# Patient Record
Sex: MALE | Race: WHITE | NOT HISPANIC OR LATINO | URBAN - METROPOLITAN AREA
[De-identification: names, ages, dates, MRNs, and addresses within clinical notes are randomized per-mention and may not be internally consistent; named-entity substitution may affect disease eponyms.]

---

## 2023-01-09 ENCOUNTER — EMERGENCY (EMERGENCY)
Facility: HOSPITAL | Age: 28
LOS: 1 days | Discharge: ANOTHER HOSPITAL | End: 2023-01-09
Admitting: EMERGENCY MEDICINE
Payer: MEDICARE

## 2023-01-09 ENCOUNTER — OUTPATIENT (OUTPATIENT)
Dept: OUTPATIENT SERVICES | Facility: HOSPITAL | Age: 28
LOS: 1 days | Discharge: STILL ADMITTED OR ADMINISTRATIVE | End: 2023-01-09
Payer: MEDICARE

## 2023-01-09 VITALS
RESPIRATION RATE: 18 BRPM | OXYGEN SATURATION: 100 % | SYSTOLIC BLOOD PRESSURE: 140 MMHG | TEMPERATURE: 98 F | HEART RATE: 96 BPM | DIASTOLIC BLOOD PRESSURE: 93 MMHG

## 2023-01-09 DIAGNOSIS — F33.2 MAJOR DEPRESSIVE DISORDER, RECURRENT SEVERE WITHOUT PSYCHOTIC FEATURES: ICD-10-CM

## 2023-01-09 DIAGNOSIS — F70 MILD INTELLECTUAL DISABILITIES: ICD-10-CM

## 2023-01-09 LAB
ALBUMIN SERPL ELPH-MCNC: 5.1 G/DL — HIGH (ref 3.3–5)
ALP SERPL-CCNC: 71 U/L — SIGNIFICANT CHANGE UP (ref 40–120)
ALT FLD-CCNC: 17 U/L — SIGNIFICANT CHANGE UP (ref 4–41)
AMPHET UR-MCNC: NEGATIVE — SIGNIFICANT CHANGE UP
ANION GAP SERPL CALC-SCNC: 15 MMOL/L — HIGH (ref 7–14)
APAP SERPL-MCNC: <10 UG/ML — LOW (ref 15–25)
APPEARANCE UR: CLEAR — SIGNIFICANT CHANGE UP
AST SERPL-CCNC: 23 U/L — SIGNIFICANT CHANGE UP (ref 4–40)
BARBITURATES UR SCN-MCNC: NEGATIVE — SIGNIFICANT CHANGE UP
BASOPHILS # BLD AUTO: 0.05 K/UL — SIGNIFICANT CHANGE UP (ref 0–0.2)
BASOPHILS NFR BLD AUTO: 0.6 % — SIGNIFICANT CHANGE UP (ref 0–2)
BENZODIAZ UR-MCNC: NEGATIVE — SIGNIFICANT CHANGE UP
BILIRUB SERPL-MCNC: 0.5 MG/DL — SIGNIFICANT CHANGE UP (ref 0.2–1.2)
BILIRUB UR-MCNC: NEGATIVE — SIGNIFICANT CHANGE UP
BUN SERPL-MCNC: 12 MG/DL — SIGNIFICANT CHANGE UP (ref 7–23)
CALCIUM SERPL-MCNC: 9.5 MG/DL — SIGNIFICANT CHANGE UP (ref 8.4–10.5)
CHLORIDE SERPL-SCNC: 101 MMOL/L — SIGNIFICANT CHANGE UP (ref 98–107)
CO2 SERPL-SCNC: 21 MMOL/L — LOW (ref 22–31)
COCAINE METAB.OTHER UR-MCNC: NEGATIVE — SIGNIFICANT CHANGE UP
COLOR SPEC: SIGNIFICANT CHANGE UP
CREAT SERPL-MCNC: 0.89 MG/DL — SIGNIFICANT CHANGE UP (ref 0.5–1.3)
CREATININE URINE RESULT, DAU: 61 MG/DL — SIGNIFICANT CHANGE UP
DIFF PNL FLD: NEGATIVE — SIGNIFICANT CHANGE UP
EGFR: 119 ML/MIN/1.73M2 — SIGNIFICANT CHANGE UP
EOSINOPHIL # BLD AUTO: 0.2 K/UL — SIGNIFICANT CHANGE UP (ref 0–0.5)
EOSINOPHIL NFR BLD AUTO: 2.4 % — SIGNIFICANT CHANGE UP (ref 0–6)
ETHANOL SERPL-MCNC: <10 MG/DL — SIGNIFICANT CHANGE UP
FLUAV AG NPH QL: SIGNIFICANT CHANGE UP
FLUBV AG NPH QL: SIGNIFICANT CHANGE UP
GLUCOSE SERPL-MCNC: 100 MG/DL — HIGH (ref 70–99)
GLUCOSE UR QL: NEGATIVE — SIGNIFICANT CHANGE UP
HCT VFR BLD CALC: 49.7 % — SIGNIFICANT CHANGE UP (ref 39–50)
HGB BLD-MCNC: 16.6 G/DL — SIGNIFICANT CHANGE UP (ref 13–17)
IANC: 4.61 K/UL — SIGNIFICANT CHANGE UP (ref 1.8–7.4)
IMM GRANULOCYTES NFR BLD AUTO: 0.1 % — SIGNIFICANT CHANGE UP (ref 0–0.9)
KETONES UR-MCNC: NEGATIVE — SIGNIFICANT CHANGE UP
LEUKOCYTE ESTERASE UR-ACNC: NEGATIVE — SIGNIFICANT CHANGE UP
LYMPHOCYTES # BLD AUTO: 2.81 K/UL — SIGNIFICANT CHANGE UP (ref 1–3.3)
LYMPHOCYTES # BLD AUTO: 34.1 % — SIGNIFICANT CHANGE UP (ref 13–44)
MCHC RBC-ENTMCNC: 28.7 PG — SIGNIFICANT CHANGE UP (ref 27–34)
MCHC RBC-ENTMCNC: 33.4 GM/DL — SIGNIFICANT CHANGE UP (ref 32–36)
MCV RBC AUTO: 85.8 FL — SIGNIFICANT CHANGE UP (ref 80–100)
METHADONE UR-MCNC: NEGATIVE — SIGNIFICANT CHANGE UP
MONOCYTES # BLD AUTO: 0.57 K/UL — SIGNIFICANT CHANGE UP (ref 0–0.9)
MONOCYTES NFR BLD AUTO: 6.9 % — SIGNIFICANT CHANGE UP (ref 2–14)
NEUTROPHILS # BLD AUTO: 4.61 K/UL — SIGNIFICANT CHANGE UP (ref 1.8–7.4)
NEUTROPHILS NFR BLD AUTO: 55.9 % — SIGNIFICANT CHANGE UP (ref 43–77)
NITRITE UR-MCNC: NEGATIVE — SIGNIFICANT CHANGE UP
NRBC # BLD: 0 /100 WBCS — SIGNIFICANT CHANGE UP (ref 0–0)
NRBC # FLD: 0 K/UL — SIGNIFICANT CHANGE UP (ref 0–0)
OPIATES UR-MCNC: NEGATIVE — SIGNIFICANT CHANGE UP
OXYCODONE UR-MCNC: NEGATIVE — SIGNIFICANT CHANGE UP
PCP SPEC-MCNC: SIGNIFICANT CHANGE UP
PCP UR-MCNC: NEGATIVE — SIGNIFICANT CHANGE UP
PH UR: 6.5 — SIGNIFICANT CHANGE UP (ref 5–8)
PLATELET # BLD AUTO: 290 K/UL — SIGNIFICANT CHANGE UP (ref 150–400)
POTASSIUM SERPL-MCNC: 3.9 MMOL/L — SIGNIFICANT CHANGE UP (ref 3.5–5.3)
POTASSIUM SERPL-SCNC: 3.9 MMOL/L — SIGNIFICANT CHANGE UP (ref 3.5–5.3)
PROT SERPL-MCNC: 8.3 G/DL — SIGNIFICANT CHANGE UP (ref 6–8.3)
PROT UR-MCNC: NEGATIVE — SIGNIFICANT CHANGE UP
RBC # BLD: 5.79 M/UL — SIGNIFICANT CHANGE UP (ref 4.2–5.8)
RBC # FLD: 12.4 % — SIGNIFICANT CHANGE UP (ref 10.3–14.5)
RSV RNA NPH QL NAA+NON-PROBE: SIGNIFICANT CHANGE UP
SALICYLATES SERPL-MCNC: <0.3 MG/DL — LOW (ref 15–30)
SARS-COV-2 RNA SPEC QL NAA+PROBE: SIGNIFICANT CHANGE UP
SODIUM SERPL-SCNC: 137 MMOL/L — SIGNIFICANT CHANGE UP (ref 135–145)
SP GR SPEC: 1.01 — LOW (ref 1.01–1.05)
THC UR QL: NEGATIVE — SIGNIFICANT CHANGE UP
TOXICOLOGY SCREEN, DRUGS OF ABUSE, SERUM RESULT: SIGNIFICANT CHANGE UP
TSH SERPL-MCNC: 1.67 UIU/ML — SIGNIFICANT CHANGE UP (ref 0.27–4.2)
UROBILINOGEN FLD QL: SIGNIFICANT CHANGE UP
WBC # BLD: 8.25 K/UL — SIGNIFICANT CHANGE UP (ref 3.8–10.5)
WBC # FLD AUTO: 8.25 K/UL — SIGNIFICANT CHANGE UP (ref 3.8–10.5)

## 2023-01-09 PROCEDURE — 99285 EMERGENCY DEPT VISIT HI MDM: CPT

## 2023-01-09 PROCEDURE — 99285 EMERGENCY DEPT VISIT HI MDM: CPT | Mod: FS

## 2023-01-09 NOTE — ED PROVIDER NOTE - PROGRESS NOTE DETAILS
NADEEM: I was signed out this pt pending admission to [facility] for for SI. labs unremarkable. Will continue to monitor pending bed availability. NADEEM: no complaints. Continue to monitor.

## 2023-01-09 NOTE — ED PROVIDER NOTE - TEST CONSIDERED BUT NOT PERFORMED
cta soft neck tissue r/o vascular pathology due to last night failed attempt trying to hand self with a dress belt, at this time no sob, difficulty of swallowing, talking, no drooling, no horasness, no coughing, no scratchy throat or pain, no swelling no ecchymosis. Since the incident he eats, swallows without discomfort very low yield of trauma or  vascular injury. Tests Considered But Not Performed

## 2023-01-09 NOTE — ED ADULT NURSE REASSESSMENT NOTE - NS ED NURSE REASSESS COMMENT FT1
Pt able to tolerate PO fluids without difficulty, no neck marks noted. Respirations even and non-labored. Speaking in full and complete sentences.
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
## 2023-01-09 NOTE — ED PROVIDER NOTE - CARE PLAN
1 Principal Discharge DX:	Suicidal behavior with attempted self-injury  Secondary Diagnosis:	Paranoia
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
## 2023-01-09 NOTE — ED BEHAVIORAL HEALTH ASSESSMENT NOTE - DESCRIPTION
none see HPI calm, in good behavioral control  Vital Signs Last 24 Hrs  T(C): 36.8 (09 Jan 2023 16:45), Max: 36.8 (09 Jan 2023 16:45)  T(F): 98.2 (09 Jan 2023 16:45), Max: 98.2 (09 Jan 2023 16:45)  HR: 96 (09 Jan 2023 16:45) (96 - 96)  BP: 140/93 (09 Jan 2023 16:45) (140/93 - 140/93)  BP(mean): --  RR: 18 (09 Jan 2023 16:45) (18 - 18)  SpO2: 100% (09 Jan 2023 16:45) (100% - 100%)
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
## 2023-01-09 NOTE — ED ADULT NURSE REASSESSMENT NOTE - NS ED NURSE REASSESS COMMENT FT1
Pt in [place] with PCA. PT in NAD, resp equal and unlabored. Calm, offers no complaints. Safety maintained.
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
## 2023-01-09 NOTE — ED BEHAVIORAL HEALTH ASSESSMENT NOTE - PSYCHIATRIC ISSUES AND PLAN (INCLUDE STANDING AND PRN MEDICATION)
Defer medication changes to inpatient team; continue Lexapro 10 mg po daily; can use Ativan 2 mg PO/IM prn agitation

## 2023-01-10 ENCOUNTER — INPATIENT (INPATIENT)
Facility: HOSPITAL | Age: 28
LOS: 13 days | Discharge: HOME | DRG: 885 | End: 2023-01-24
Attending: PSYCHIATRY & NEUROLOGY | Admitting: PSYCHIATRY & NEUROLOGY
Payer: MEDICARE

## 2023-01-10 VITALS
HEART RATE: 65 BPM | TEMPERATURE: 98 F | DIASTOLIC BLOOD PRESSURE: 64 MMHG | OXYGEN SATURATION: 100 % | RESPIRATION RATE: 16 BRPM | SYSTOLIC BLOOD PRESSURE: 116 MMHG

## 2023-01-10 VITALS — OXYGEN SATURATION: 99 % | TEMPERATURE: 98 F | HEIGHT: 71 IN | RESPIRATION RATE: 16 BRPM | WEIGHT: 194.01 LBS

## 2023-01-10 DIAGNOSIS — F43.29 ADJUSTMENT DISORDER WITH OTHER SYMPTOMS: ICD-10-CM

## 2023-01-10 DIAGNOSIS — F32.0 MAJOR DEPRESSIVE DISORDER, SINGLE EPISODE, MILD: ICD-10-CM

## 2023-01-10 DIAGNOSIS — F79 UNSPECIFIED INTELLECTUAL DISABILITIES: ICD-10-CM

## 2023-01-10 LAB
COVID-19 SPIKE DOMAIN AB INTERP: POSITIVE
COVID-19 SPIKE DOMAIN ANTIBODY RESULT: >250 U/ML — HIGH
SARS-COV-2 IGG+IGM SERPL QL IA: >250 U/ML — HIGH
SARS-COV-2 IGG+IGM SERPL QL IA: POSITIVE

## 2023-01-10 PROCEDURE — 80061 LIPID PANEL: CPT

## 2023-01-10 PROCEDURE — 99214 OFFICE O/P EST MOD 30 MIN: CPT

## 2023-01-10 PROCEDURE — 36415 COLL VENOUS BLD VENIPUNCTURE: CPT

## 2023-01-10 PROCEDURE — 99223 1ST HOSP IP/OBS HIGH 75: CPT

## 2023-01-10 PROCEDURE — 83036 HEMOGLOBIN GLYCOSYLATED A1C: CPT

## 2023-01-10 PROCEDURE — 84443 ASSAY THYROID STIM HORMONE: CPT

## 2023-01-10 RX ORDER — ACETAMINOPHEN 500 MG
650 TABLET ORAL EVERY 6 HOURS
Refills: 0 | Status: DISCONTINUED | OUTPATIENT
Start: 2023-01-10 | End: 2023-01-24

## 2023-01-10 RX ORDER — LANOLIN ALCOHOL/MO/W.PET/CERES
3 CREAM (GRAM) TOPICAL AT BEDTIME
Refills: 0 | Status: DISCONTINUED | OUTPATIENT
Start: 2023-01-10 | End: 2023-01-11

## 2023-01-10 RX ORDER — ESCITALOPRAM OXALATE 10 MG/1
20 TABLET, FILM COATED ORAL DAILY
Refills: 0 | Status: DISCONTINUED | OUTPATIENT
Start: 2023-01-11 | End: 2023-01-24

## 2023-01-10 RX ORDER — MAGNESIUM HYDROXIDE 400 MG/1
30 TABLET, CHEWABLE ORAL DAILY
Refills: 0 | Status: DISCONTINUED | OUTPATIENT
Start: 2023-01-10 | End: 2023-01-24

## 2023-01-10 RX ORDER — INFLUENZA VIRUS VACCINE 15; 15; 15; 15 UG/.5ML; UG/.5ML; UG/.5ML; UG/.5ML
0.5 SUSPENSION INTRAMUSCULAR ONCE
Refills: 0 | Status: COMPLETED | OUTPATIENT
Start: 2023-01-10 | End: 2023-01-10

## 2023-01-10 RX ADMIN — Medication 3 MILLIGRAM(S): at 21:38

## 2023-01-10 NOTE — BH INPATIENT PSYCHIATRY ASSESSMENT NOTE - NSCOMMENTSUICRISKFACT_PSY_ALL_CORE
PROCEDURES:  Laparoscopic cholecystectomy 08-Aug-2021 19:53:45  Aldo Snede  
The pt reports feeling depressed and anxious but he denies any current active SI or HI and pt denies any current plan or intent to harm himself or anyone else

## 2023-01-10 NOTE — BH INPATIENT PSYCHIATRY ASSESSMENT NOTE - NSBHCHARTREVIEWLAB_PSY_A_CORE FT
TSH= 1.67  SARS COV-2 negative 1/9/2023  COVID  AB + 250  1/9/2023  urine tox screen negative for any substances of potential abuse/misuse  BAL  < 10

## 2023-01-10 NOTE — BH PATIENT PROFILE - FUNCTIONAL ASSESSMENT - DAILY ACTIVITY ASSESSMENT TYPE
Verified Results  BASIC METABOLIC PNL [id number] 12:01AM GARETT WHEATLEY   [Jan 14, 2017 4:01PM GARETT WHEATLEY]  improved renal function--con't to avoid nsaids. schedule cmp/lipids in 6 motnhs to follow.     Test Name Result Flag Reference   FASTING STATUS UNKNOWN hrs     SODIUM 139 mmol/L  135-145   POTASSIUM 4.2 mmol/L  3.4-5.1   CHLORIDE 105 mmol/L     CARBON DIOXIDE 24 mmol/L  21-32   ANION GAP 14 mmol/L  10-20   GLUCOSE 82 mg/dl  65-99   BUN 25 mg/dl H 10-20   CREATININE 0.99 mg/dl H 0.51-0.95   GFR EST. AMER 67     In patients with known chronic kidney disease, the stage of disease based on eGFR is interpreted as follows:  eGFR results 60-89 mL/min/1.73m2 = Stage II CKD (chronic kidney disease), or mild kidney disease.   GFR EST.NONAFRI AMER 58     In patients with known chronic kidney disease, the stage of disease based on eGFR is interpreted as follows:  eGFR results 30 - 59 mL/min/1.73m2 = Stage III CKD (chronic kidney disease), or moderate kidney disease.   BUN/CREATININE RATIO 25  7-25   CALCIUM 8.8 mg/dl  8.4-10.2        Admission

## 2023-01-10 NOTE — ED ADULT NURSE REASSESSMENT NOTE - NS ED NURSE REASSESS COMMENT FT1
Pt sleeping in  low acuity room. Respirations even and unlabored, no accessory muscle use. NAD not at this time. Awaiting available bed.

## 2023-01-10 NOTE — ED BEHAVIORAL HEALTH PROGRESS NOTE - CASE SUMMARY/FORMULATION (CLEARLY DOCUMENT RATIONALE FOR DISPOSITION CHANGE)
26 y/o male, single, noncaregiver, disabled, lives with mother, history of mild intellectual disability, prescribed Lexapro 10 mg po daily by a neurologist, no past psych admissions, 3 past SA, history of hitting self, no PMH, no h/o substance abuse, referred by mother/[facility] s/p suicidal gesture (wrapped belt around neck) and ongoing SI.   Patient reports feeling more depressed and endorses SI with plan to strangle self with a belt or slit his throat. Pt is unable to safety plan at this time. He requests and meets criteria for voluntary psychiatric admission for safety and stabilization.

## 2023-01-10 NOTE — BH INPATIENT PSYCHIATRY ASSESSMENT NOTE - OTHER
occasional ? stress related ? AH non command type in the context of family stressors borderline intellectual functioning

## 2023-01-10 NOTE — BH INPATIENT PSYCHIATRY ASSESSMENT NOTE - NSBHCHARTREVIEWVS_PSY_A_CORE FT
Vital Signs Last 24 Hrs  T(C): 36.8 (01-10-23 @ 13:24), Max: 36.8 (01-09-23 @ 16:45)  T(F): 98.2 (01-10-23 @ 13:24), Max: 98.2 (01-09-23 @ 16:45)  HR: 65 (01-10-23 @ 07:47) (65 - 96)  BP: 116/64 (01-10-23 @ 07:47) (116/64 - 140/93)  BP(mean): --  RR: 16 (01-10-23 @ 13:24) (16 - 18)  SpO2: 99% (01-10-23 @ 13:24) (98% - 100%)    Orthostatic VS  01-10-23 @ 13:24  Lying BP: --/-- HR: --  Sitting BP: 125/62 HR: 84  Standing BP: 104/68 HR: 92  Site: --  Mode: --

## 2023-01-10 NOTE — ED BEHAVIORAL HEALTH PROGRESS NOTE - SUMMARY
28 y/o male, single, noncaregiver, disabled, lives with mother, history of mild intellectual disability, prescribed Lexapro 10 mg po daily by a neurologist, no past psych admissions, 3 past SA, history of hitting self, no PMH, no h/o substance abuse, referred by mother/[facility] s/p suicidal gesture (wrapped belt around neck) and ongoing SI.   Patient reports feeling more depressed and endorses SI with plan to strangle self with a belt or slit his throat. Pt is unable to safety plan at this time. He requests and meets criteria for voluntary psychiatric admission for safety and stabilization.

## 2023-01-10 NOTE — BH SAFETY PLAN - INTERNAL COPING STRATEGY 2
I love football and the [employer]. I have a football blog and keep up with podcasts about the game. I like making predictions for how their season will go. Football video games.

## 2023-01-10 NOTE — PSYCHIATRIC REHAB INITIAL EVALUATION - NSBHPRTOOLBOX_PSY_ALL_CORE
Entertainment/Family support/Mindfulness practice/Music/Outdoor activity/Spirituality/Pentecostal/Treatment team provider support
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
## 2023-01-10 NOTE — BH INPATIENT PSYCHIATRY ASSESSMENT NOTE - NSBHATTESTBILLING_PSY_A_CORE
89538-[id number] diagnostic evaluation with medical services 66620-Vkuaenjjbu OBS or IP - high complexity OR 50-79 mins

## 2023-01-10 NOTE — BH INPATIENT PSYCHIATRY ASSESSMENT NOTE - CURRENT MEDICATION
MEDICATIONS  (STANDING):  influenza   Vaccine 0.5 milliLiter(s) IntraMuscular once  melatonin 3 milliGRAM(s) Oral at bedtime  multivitamin 1 Tablet(s) Oral daily    MEDICATIONS  (PRN):  acetaminophen     Tablet .. 650 milliGRAM(s) Oral every 6 hours PRN Temp greater or equal to 38C (100.4F), Mild Pain (1 - 3), Moderate Pain (4 - 6)  aluminum hydroxide/magnesium hydroxide/simethicone Suspension 30 milliLiter(s) Oral every 6 hours PRN Dyspepsia  magnesium hydroxide Suspension 30 milliLiter(s) Oral daily PRN Constipation
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
## 2023-01-10 NOTE — BH INPATIENT PSYCHIATRY ASSESSMENT NOTE - NSBHMETABOLIC_PSY_ALL_CORE_FT
BMI: BMI (kg/m2): 27.1 (01-10-23 @ 13:24)  HbA1c:   Glucose:   BP: --  Lipid Panel:  BMI: BMI (kg/m2): 27.1 (01-10-23 @ 13:24)  HbA1c: pending  Glucose:   BP: --  Lipid Panel:  pending

## 2023-01-10 NOTE — BH PATIENT PROFILE - HOME MEDICATIONS
No Rx/Hx Recorded Multiple Vitamins oral tablet , 1 tab(s) orally once a day  melatonin 10 mg oral tablet , 1 tab(s) orally once a day (at bedtime)  escitalopram 20 mg oral tablet , 1 tab(s) orally once a day
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
## 2023-01-10 NOTE — BH INPATIENT PSYCHIATRY ASSESSMENT NOTE - NSBHCRANIAL_PSY_ALL_CORE
Smiles, shows teeth, opens mouth, sticks out tongue (V, VII, XI)/Normal speech (IX, X, XII)/Shoulder shrug (XI)/Hearing intact (VIII)

## 2023-01-10 NOTE — BH PATIENT PROFILE - NSTOBACCONEVERSMOKERY/N_GEN_A
Patient has an upcoming appointment .     Future Appointments   Date Time Provider Figueroa Merritt   8/18/2021 12:30 PM Avel Low MD [facility] File No

## 2023-01-10 NOTE — BH INPATIENT PSYCHIATRY ASSESSMENT NOTE - NSDCCRITERIA_PSY_ALL_CORE
significant resolution of the pt's functionally impairing anxious depression in the context of family acute stress reaction  pt ability to keep himself safe and to use coping skills and safety planning skills he learns while in hospital

## 2023-01-11 LAB
A1C WITH ESTIMATED AVERAGE GLUCOSE RESULT: 5.2 % — SIGNIFICANT CHANGE UP (ref 4–5.6)
CHOLEST SERPL-MCNC: 174 MG/DL — SIGNIFICANT CHANGE UP
ESTIMATED AVERAGE GLUCOSE: 103 MG/DL — SIGNIFICANT CHANGE UP (ref 68–114)
HDLC SERPL-MCNC: 47 MG/DL — SIGNIFICANT CHANGE UP
LIPID PNL WITH DIRECT LDL SERPL: 109 MG/DL — HIGH
NON HDL CHOLESTEROL: 127 MG/DL — SIGNIFICANT CHANGE UP
TRIGL SERPL-MCNC: 87 MG/DL — SIGNIFICANT CHANGE UP
TSH SERPL-MCNC: 1.11 UU/ML — SIGNIFICANT CHANGE UP (ref 0.34–4.82)

## 2023-01-11 PROCEDURE — 99232 SBSQ HOSP IP/OBS MODERATE 35: CPT

## 2023-01-11 RX ORDER — LANOLIN ALCOHOL/MO/W.PET/CERES
5 CREAM (GRAM) TOPICAL AT BEDTIME
Refills: 0 | Status: DISCONTINUED | OUTPATIENT
Start: 2023-01-11 | End: 2023-01-19

## 2023-01-11 RX ADMIN — Medication 650 MILLIGRAM(S): at 10:38

## 2023-01-11 RX ADMIN — ESCITALOPRAM OXALATE 20 MILLIGRAM(S): 10 TABLET, FILM COATED ORAL at 09:10

## 2023-01-11 RX ADMIN — Medication 1 TABLET(S): at 09:10

## 2023-01-11 RX ADMIN — Medication 5 MILLIGRAM(S): at 21:50

## 2023-01-11 NOTE — BH INPATIENT PSYCHIATRY PROGRESS NOTE - OTHER
borderline intellectual functioning occasional ? stress related ? AH non command type in the context of family stressors

## 2023-01-11 NOTE — BH INPATIENT PSYCHIATRY PROGRESS NOTE - NSBHFUPINTERVALCCFT_PSY_A_CORE
" I am feeling a little better but my anxiety sometimes just goes up through the roof like a panic attack when some little thing happens."
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
## 2023-01-12 PROCEDURE — 99232 SBSQ HOSP IP/OBS MODERATE 35: CPT

## 2023-01-12 PROCEDURE — 99222 1ST HOSP IP/OBS MODERATE 55: CPT | Mod: FS

## 2023-01-12 RX ADMIN — Medication 1 TABLET(S): at 08:38

## 2023-01-12 RX ADMIN — ESCITALOPRAM OXALATE 20 MILLIGRAM(S): 10 TABLET, FILM COATED ORAL at 08:38

## 2023-01-12 RX ADMIN — Medication 5 MILLIGRAM(S): at 21:16
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
## 2023-01-12 NOTE — H&P ADULT - NSHPPHYSICALEXAM_GEN_ALL_CORE
Vital Signs Last 24 Hrs  T(C): 36.1 (11 Jan 2023 07:49), Max: 36.1 (11 Jan 2023 07:49)  T(F): 97 (11 Jan 2023 07:49), Max: 97 (11 Jan 2023 07:49)  HR: -80  BP: 121/73  RR: 18 (11 Jan 2023 07:49) (18 - 18)  SpO2: 100% (11 Jan 2023 07:49) (100% - 100%)

## 2023-01-12 NOTE — H&P ADULT - ASSESSMENT
26 y/o male with no significant past medical history admitted to inpatient psychiatry for evaluation and management of depression with suicidal ideation.     # Major depression   - Management per psych  - Patient is medically stable for psych admission     # hyperglycemia  Hgba1c is 5.2  - Life style modification     DVT ppx  Pt is ambulatory, encourage ambulation     Will sign off, reconsult PRN    D/w Dr. Pepper
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
## 2023-01-12 NOTE — BH SOCIAL WORK INITIAL PSYCHOSOCIAL EVALUATION - NSCMSPTSTRENGTHS_PSY_ALL_CORE
Compliance to treatment/Courageous/Expressive of emotions/Jennifer/spirituality/Future/goal oriented/Intact family/Intelligence/Motivated/Physically healthy/Strong support system/Supportive family Pts mother is primary supportive family member/Compliance to treatment/Courageous/Expressive of emotions/Jennifer/spirituality/Future/goal oriented/Intact family/Intelligence/Motivated/Physically healthy/Strong support system/Supportive family

## 2023-01-12 NOTE — H&P ADULT - NS ATTEND AMEND GEN_ALL_CORE FT
28 y/o M PMHx as noted above admitted to inpatient Psychiatry for management of depression with suicidal ideation.     #Major depression   ~cont. management per inpatient Psychiatry    #Hyperglycemia  -as above HgBA1c is 5.2  -cont. management as above    #Vte ppx  -as above patient is ambulatory, encourage ambulation

## 2023-01-12 NOTE — BH SOCIAL WORK INITIAL PSYCHOSOCIAL EVALUATION - NSBHLIFEGOAL_PSY_ALL_CORE
Feel safe and get life on track. Pt reports he wants to look into family therapy with his mother and also private therapy.

## 2023-01-12 NOTE — BH SOCIAL WORK INITIAL PSYCHOSOCIAL EVALUATION - NSBHCOGDIS_PSY_ALL_CORE
Pt reports dx of PDD - pervasive developmental disorder dx since childhood Pt received special education services and speech therapy./Yes

## 2023-01-12 NOTE — BH INPATIENT PSYCHIATRY PROGRESS NOTE - OTHER
slowly appearing less anxious and depressed affect slowly becoming less constricted  borderline intellectual functioning pt with fewer guilt ridden concerns  and preoccupations occasional ? stress related ? AH non command type in the context of family stressors

## 2023-01-12 NOTE — BH SOCIAL WORK INITIAL PSYCHOSOCIAL EVALUATION - OTHER PAST PSYCHIATRIC HISTORY (INCLUDE DETAILS REGARDING ONSET, COURSE OF ILLNESS, INPATIENT/OUTPATIENT TREATMENT)
Pt with no past psych admissions, 3 past SA, hx of NS SIB (hitting self and banging head on hard surfaces), hx of depression, anxiety and self reported PTSD from childhood trauma.   On Sunday (1/8/23), pt wrapped a belt around his neck to try to kill himself but was stopped by his mother.   Pt currently endorses SI with plan to strangle self with a belt or slit his throat.
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
## 2023-01-13 PROCEDURE — 99232 SBSQ HOSP IP/OBS MODERATE 35: CPT

## 2023-01-13 RX ADMIN — Medication 5 MILLIGRAM(S): at 21:18

## 2023-01-13 RX ADMIN — ESCITALOPRAM OXALATE 20 MILLIGRAM(S): 10 TABLET, FILM COATED ORAL at 09:50

## 2023-01-13 RX ADMIN — Medication 1 TABLET(S): at 09:50

## 2023-01-13 NOTE — BH INPATIENT PSYCHIATRY PROGRESS NOTE - OTHER
How Severe Is Your Skin Lesion?: mild Has Your Skin Lesion Been Treated?: not been treated Is This A New Presentation, Or A Follow-Up?: Growths slowly appearing less anxious and depressed affect slowly becoming less constricted  pt with fewer guilt ridden concerns  and preoccupations borderline intellectual functioning occasional ? stress related ? AH non command type in the context of family stressors

## 2023-01-13 NOTE — BH INPATIENT PSYCHIATRY PROGRESS NOTE - NSBHFUPINTERVALCCFT_PSY_A_CORE
" I am going to all the groups. I want to be there for my mother to get a hug from her and be the best person I can be for her.'

## 2023-01-14 RX ADMIN — Medication 650 MILLIGRAM(S): at 14:38

## 2023-01-14 RX ADMIN — Medication 5 MILLIGRAM(S): at 20:37

## 2023-01-14 RX ADMIN — ESCITALOPRAM OXALATE 20 MILLIGRAM(S): 10 TABLET, FILM COATED ORAL at 09:25

## 2023-01-14 RX ADMIN — Medication 1 TABLET(S): at 09:24

## 2023-01-14 RX ADMIN — Medication 650 MILLIGRAM(S): at 15:20

## 2023-01-15 RX ADMIN — Medication 5 MILLIGRAM(S): at 20:17

## 2023-01-15 RX ADMIN — ESCITALOPRAM OXALATE 20 MILLIGRAM(S): 10 TABLET, FILM COATED ORAL at 09:16

## 2023-01-15 RX ADMIN — Medication 1 TABLET(S): at 09:16

## 2023-01-16 RX ADMIN — ESCITALOPRAM OXALATE 20 MILLIGRAM(S): 10 TABLET, FILM COATED ORAL at 09:23

## 2023-01-16 RX ADMIN — Medication 1 TABLET(S): at 09:23

## 2023-01-16 RX ADMIN — Medication 650 MILLIGRAM(S): at 22:23

## 2023-01-16 RX ADMIN — Medication 650 MILLIGRAM(S): at 21:19

## 2023-01-16 RX ADMIN — Medication 5 MILLIGRAM(S): at 21:00

## 2023-01-17 PROCEDURE — 99232 SBSQ HOSP IP/OBS MODERATE 35: CPT

## 2023-01-17 RX ADMIN — Medication 5 MILLIGRAM(S): at 21:23

## 2023-01-17 RX ADMIN — ESCITALOPRAM OXALATE 20 MILLIGRAM(S): 10 TABLET, FILM COATED ORAL at 09:31

## 2023-01-17 RX ADMIN — Medication 1 TABLET(S): at 09:31
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
## 2023-01-17 NOTE — BH INPATIENT PSYCHIATRY PROGRESS NOTE - OTHER
slowly appearing less anxious and depressed affect slowly becoming less constricted  borderline intellectual functioning occasional ? stress related ? AH non command type in the context of family stressors pt with fewer guilt ridden concerns  and preoccupations

## 2023-01-18 PROCEDURE — 99232 SBSQ HOSP IP/OBS MODERATE 35: CPT

## 2023-01-18 RX ADMIN — ESCITALOPRAM OXALATE 20 MILLIGRAM(S): 10 TABLET, FILM COATED ORAL at 09:25

## 2023-01-18 RX ADMIN — Medication 1 TABLET(S): at 09:25

## 2023-01-18 RX ADMIN — Medication 5 MILLIGRAM(S): at 20:46

## 2023-01-18 NOTE — BH TREATMENT PLAN - NSTXCOPEINTERPR_PSY_ALL_CORE
Patient will be encouraged to attend 1-2 psych rehab groups to work on coping skill development.
Patient has been actively participating in psych rehab groups. Patient's treatment plan goal will be continued for 7 days.

## 2023-01-18 NOTE — BH TREATMENT PLAN - NSTXDCOTHRINTERSW_PSY_ALL_CORE
will meet with patient to address above stated goals
 will meet with patient to address above stated goals
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
## 2023-01-18 NOTE — BH TREATMENT PLAN - NSTXDCOTHRGOAL_PSY_ALL_CORE
Patient will be referred to the appropriate level of outpatient treatment and have appointments within 3-5 days of discharge.  Patient will be discharged to safe housing either back home or DSS emergency housing.  Benefits in place   will explore need for duel diagnosis tx with appointments if needed.
Patient will be referred to the appropriate level of outpatient treatment and have appointments within 3-5 days of discharge.  Patient will be discharged to safe housing either back home or DSS emergency housing.  Benefits in place   will explore need for duel diagnosis tx with appointments if needed.
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
## 2023-01-18 NOTE — BH TREATMENT PLAN - NSPTSTATEDGOAL_PSY_ALL_CORE
Feel safe and get life on track. Pt reports he wants to look into family therapy with his mother and also private therapy.
 " to feel not so anxious and depressed. To be strong for my mother."

## 2023-01-18 NOTE — BH INPATIENT PSYCHIATRY PROGRESS NOTE - OTHER
slowly appearing less anxious and depressed affect slowly becoming less constricted  and gonzalez in range and intensity good, intense and at times still wary pt with fewer guilt ridden concerns  and preoccupations, somewhat concrete  slowly normalizing in rate and productivity, goal directed borderline intellectual functioning " I feel better. Less depressed. Still a little anxious." the pt remains cautious and selective in his choice of staff and peers in  whom to confide

## 2023-01-18 NOTE — BH TREATMENT PLAN - NSDCCRITERIA_PSY_ALL_CORE
significant resolution of the pt's functionally impairing anxious depression in the context of family acute stress reaction  pt ability to keep himself safe and to use coping skills and safety planning skills he learns while in hospital
significant resolution of the pt's functionally impairing anxious depression in the context of family acute stress reaction  pt ability to keep himself safe and to use coping skills and safety planning skills he learns while in hospital
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
## 2023-01-18 NOTE — BH TREATMENT PLAN - NSCMSPTSTRENGTHS_PSY_ALL_CORE
Pts mother is primary supportive family member/Compliance to treatment/Courageous/Expressive of emotions/Jennifer/spirituality/Future/goal oriented/Intact family/Intelligence/Motivated/Physically healthy/Positive attitude/Resourceful/Strong support system/Supportive family
Compliance to treatment/Courageous/Expressive of emotions/Jennifer/spirituality/Future/goal oriented/Intact family/Intelligence/Motivated/Physically healthy/Strong support system/Supportive family
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
## 2023-01-19 PROCEDURE — 99232 SBSQ HOSP IP/OBS MODERATE 35: CPT

## 2023-01-19 RX ORDER — LANOLIN ALCOHOL/MO/W.PET/CERES
10 CREAM (GRAM) TOPICAL AT BEDTIME
Refills: 0 | Status: DISCONTINUED | OUTPATIENT
Start: 2023-01-19 | End: 2023-01-24

## 2023-01-19 RX ADMIN — Medication 10 MILLIGRAM(S): at 21:44

## 2023-01-19 RX ADMIN — ESCITALOPRAM OXALATE 20 MILLIGRAM(S): 10 TABLET, FILM COATED ORAL at 08:52

## 2023-01-19 RX ADMIN — Medication 1 TABLET(S): at 08:53

## 2023-01-19 NOTE — BH INPATIENT PSYCHIATRY PROGRESS NOTE - OTHER
slowly appearing less anxious and depressed borderline intellectual functioning good, intense and at times still wary " I feel better. Less depressed. Still a little anxious." the pt remains cautious and selective in his choice of staff and peers in  whom to confide pt with fewer guilt ridden concerns  and preoccupations, somewhat concrete  affect slowly becoming less constricted  and gonzalez in range and intensity slowly normalizing in rate and productivity, goal directed
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
## 2023-01-19 NOTE — BH INPATIENT PSYCHIATRY PROGRESS NOTE - NSBHFUPINTERVALCCFT_PSY_A_CORE
" I am still having trouble sleeping and staying asleep. I think I may be having thoughts of what happened before. I think I am worrying about if we will be safe when I leave here."

## 2023-01-20 PROCEDURE — 99232 SBSQ HOSP IP/OBS MODERATE 35: CPT

## 2023-01-20 RX ADMIN — Medication 10 MILLIGRAM(S): at 21:05

## 2023-01-20 RX ADMIN — Medication 1 TABLET(S): at 09:54

## 2023-01-20 RX ADMIN — ESCITALOPRAM OXALATE 20 MILLIGRAM(S): 10 TABLET, FILM COATED ORAL at 09:54
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
## 2023-01-20 NOTE — BH INPATIENT PSYCHIATRY PROGRESS NOTE - OTHER
" I feel better. Less depressed. Still a little anxious." slowly normalizing in rate and productivity, goal directed the pt remains cautious and selective in his choice of staff and peers in  whom to confide borderline intellectual functioning affect slowly becoming less constricted  and gonzalez in range and intensity pt with fewer guilt ridden concerns  and preoccupations, somewhat concrete  good, intense and at times still wary slowly appearing less anxious and depressed

## 2023-01-20 NOTE — BH INPATIENT PSYCHIATRY PROGRESS NOTE - NSBHFUPINTERVALCCFT_PSY_A_CORE
" I slept better . I think I am starting to feel more confident about myself and where I go from here."

## 2023-01-21 RX ADMIN — ESCITALOPRAM OXALATE 20 MILLIGRAM(S): 10 TABLET, FILM COATED ORAL at 09:34

## 2023-01-21 RX ADMIN — Medication 1 TABLET(S): at 09:33

## 2023-01-21 RX ADMIN — Medication 10 MILLIGRAM(S): at 20:16

## 2023-01-22 RX ADMIN — Medication 1 TABLET(S): at 08:57

## 2023-01-22 RX ADMIN — ESCITALOPRAM OXALATE 20 MILLIGRAM(S): 10 TABLET, FILM COATED ORAL at 08:57

## 2023-01-22 RX ADMIN — Medication 10 MILLIGRAM(S): at 21:07

## 2023-01-23 PROCEDURE — 99232 SBSQ HOSP IP/OBS MODERATE 35: CPT

## 2023-01-23 RX ADMIN — ESCITALOPRAM OXALATE 20 MILLIGRAM(S): 10 TABLET, FILM COATED ORAL at 09:24

## 2023-01-23 RX ADMIN — Medication 1 TABLET(S): at 09:24

## 2023-01-23 RX ADMIN — Medication 10 MILLIGRAM(S): at 21:04

## 2023-01-23 NOTE — BH INPATIENT PSYCHIATRY PROGRESS NOTE - NSTXPOTSDGOAL_PSY_ALL_CORE
Will report decrease in nightmares to 1x weekly

## 2023-01-23 NOTE — BH INPATIENT PSYCHIATRY PROGRESS NOTE - NSICDXBHPRIMARYDX_PSY_ALL_CORE
Major depression, recurrent   F33.9  

## 2023-01-23 NOTE — BH INPATIENT PSYCHIATRY PROGRESS NOTE - NSBHMSETHTCONTENT_PSY_A_CORE
Preoccupations/Ruminations/Other
Preoccupations/Ruminations/Hopelessness/Guilt
Preoccupations/Ruminations/Other
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
## 2023-01-23 NOTE — BH INPATIENT PSYCHIATRY PROGRESS NOTE - NSBHMSEMOOD_PSY_A_CORE
Anxious/Other
Depressed/Anxious
Anxious/Other
Depressed/Anxious
Anxious/Other
Anxious/Other
Depressed/Anxious
Depressed/Anxious

## 2023-01-23 NOTE — BH INPATIENT PSYCHIATRY PROGRESS NOTE - OTHER
affect slowly becoming less constricted  and gonzalez in range and intensity the pt remains cautious and selective in his choice of staff and peers in  whom to confide slowly appearing less anxious and depressed borderline intellectual functioning good, intense and at times still wary " I feel better. Less depressed. Still a little anxious." slowly normalizing in rate and productivity, goal directed pt with fewer guilt ridden concerns  and preoccupations, somewhat concrete  tearful and overflowing with pt reported gratitude for life lessons learned affect  becoming less constricted  and gonzalez in range and intensity the pt remains cautious and selective in his choice of staff and peers in  whom to confide, pt slowly becoming more trusting

## 2023-01-23 NOTE — BH INPATIENT PSYCHIATRY PROGRESS NOTE - NSBHMSESPABN_PSY_A_CORE
Soft volume/Slowed rate/Decreased productivity/Increased latency
Other
Other
Soft volume/Slowed rate/Decreased productivity/Increased latency
Soft volume/Slowed rate/Decreased productivity/Increased latency
Other
Other
Soft volume/Slowed rate/Decreased productivity/Increased latency

## 2023-01-23 NOTE — BH INPATIENT PSYCHIATRY PROGRESS NOTE - NSTXDCOTHRDATEEST_PSY_ALL_CORE
10-Aron-2023
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
## 2023-01-23 NOTE — BH INPATIENT PSYCHIATRY PROGRESS NOTE - NSTXSUICIDDATEEST_PSY_ALL_CORE
18-Jan-2023
10-Aron-2023
18-Jan-2023
10-Aron-2023
18-Jan-2023

## 2023-01-23 NOTE — BH INPATIENT PSYCHIATRY PROGRESS NOTE - PRN MEDS
MEDICATIONS  (PRN):  acetaminophen     Tablet .. 650 milliGRAM(s) Oral every 6 hours PRN Temp greater or equal to 38C (100.4F), Mild Pain (1 - 3), Moderate Pain (4 - 6)  aluminum hydroxide/magnesium hydroxide/simethicone Suspension 30 milliLiter(s) Oral every 6 hours PRN Dyspepsia  magnesium hydroxide Suspension 30 milliLiter(s) Oral daily PRN Constipation  

## 2023-01-23 NOTE — BH INPATIENT PSYCHIATRY PROGRESS NOTE - NSTXPROBPOTSD_PSY_ALL_CORE
POST-TRAUMATIC STRESS

## 2023-01-23 NOTE — BH INPATIENT PSYCHIATRY PROGRESS NOTE - NSBHFUPINTERVALCCFT_PSY_A_CORE
" I'm feeling better now. Ready to move forward with my life.' " I'm feeling better now. Ready to move forward with my life. My sleep has been so -so even with the added Melatonin."
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
## 2023-01-23 NOTE — BH INPATIENT PSYCHIATRY PROGRESS NOTE - NSBHCHARTREVIEWVS_PSY_A_CORE FT
Vital Signs Last 24 Hrs  T(C): 36.3 (01-17-23 @ 07:28), Max: 36.3 (01-17-23 @ 07:28)  T(F): 97.4 (01-17-23 @ 07:28), Max: 97.4 (01-17-23 @ 07:28)  HR: --  BP: --  BP(mean): --  RR: 15 (01-17-23 @ 07:28) (15 - 15)  SpO2: 98% (01-17-23 @ 07:28) (98% - 98%)    Orthostatic VS  01-17-23 @ 07:28  Lying BP: --/-- HR: --  Sitting BP: 118/72 HR: 73  Standing BP: 106/69 HR: 80  Site: upper right arm  Mode: electronic  Orthostatic VS  01-16-23 @ 07:28  Lying BP: --/-- HR: --  Sitting BP: 115/68 HR: 84  Standing BP: 111/65 HR: 90  Site: upper right arm  Mode: electronic  
Vital Signs Last 24 Hrs  T(C): 36.8 (01-13-23 @ 08:32), Max: 36.8 (01-13-23 @ 08:32)  T(F): 98.2 (01-13-23 @ 08:32), Max: 98.2 (01-13-23 @ 08:32)  HR: --  BP: --  BP(mean): --  RR: 16 (01-13-23 @ 08:32) (16 - 16)  SpO2: 100% (01-13-23 @ 08:32) (100% - 100%)    Orthostatic VS  01-13-23 @ 08:32  Lying BP: --/-- HR: --  Sitting BP: 128/92 HR: 102  Standing BP: 118/78 HR: 94  Site: upper right arm  Mode: electronic  Orthostatic VS  01-12-23 @ 07:41  Lying BP: --/-- HR: --  Sitting BP: 115/73 HR: 85  Standing BP: 110/59 HR: 102  Site: upper right arm  Mode: electronic  
Vital Signs Last 24 Hrs  T(C): 36.7 (01-19-23 @ 07:07), Max: 36.7 (01-19-23 @ 07:07)  T(F): 98 (01-19-23 @ 07:07), Max: 98 (01-19-23 @ 07:07)  HR: --  BP: --  BP(mean): --  RR: 16 (01-19-23 @ 07:07) (16 - 16)  SpO2: 98% (01-19-23 @ 07:07) (98% - 98%)    Orthostatic VS  01-19-23 @ 07:07  Lying BP: --/-- HR: --  Sitting BP: 126/73 HR: 74  Standing BP: 118/74 HR: 98  Site: upper right arm  Mode: electronic  Orthostatic VS  01-18-23 @ 08:19  Lying BP: --/-- HR: --  Sitting BP: 111/77 HR: 86  Standing BP: 111/71 HR: 99  Site: upper right arm  Mode: electronic  
Vital Signs Last 24 Hrs  T(C): 36.6 (01-18-23 @ 08:19), Max: 36.6 (01-18-23 @ 08:19)  T(F): 97.9 (01-18-23 @ 08:19), Max: 97.9 (01-18-23 @ 08:19)  HR: --  BP: --  BP(mean): --  RR: 18 (01-18-23 @ 08:19) (18 - 18)  SpO2: 100% (01-18-23 @ 08:19) (100% - 100%)    Orthostatic VS  01-18-23 @ 08:19  Lying BP: --/-- HR: --  Sitting BP: 111/77 HR: 86  Standing BP: 111/71 HR: 99  Site: upper right arm  Mode: electronic  Orthostatic VS  01-17-23 @ 07:28  Lying BP: --/-- HR: --  Sitting BP: 118/72 HR: 73  Standing BP: 106/69 HR: 80  Site: upper right arm  Mode: electronic  
Vital Signs Last 24 Hrs  T(C): 36.1 (01-11-23 @ 07:49), Max: 36.1 (01-11-23 @ 07:49)  T(F): 97 (01-11-23 @ 07:49), Max: 97 (01-11-23 @ 07:49)  HR: --  BP: --  BP(mean): --  RR: 18 (01-11-23 @ 07:49) (18 - 18)  SpO2: 100% (01-11-23 @ 07:49) (100% - 100%)    Orthostatic VS  01-11-23 @ 07:49  Lying BP: --/-- HR: --  Sitting BP: 121/73 HR: 80  Standing BP: 123/83 HR: 87  Site: upper right arm  Mode: electronic  Orthostatic VS  01-10-23 @ 13:24  Lying BP: --/-- HR: --  Sitting BP: 125/62 HR: 84  Standing BP: 104/68 HR: 92  Site: --  Mode: --  
Vital Signs Last 24 Hrs  T(C): 36.3 (01-23-23 @ 07:56), Max: 36.3 (01-23-23 @ 07:56)  T(F): 97.4 (01-23-23 @ 07:56), Max: 97.4 (01-23-23 @ 07:56)  HR: --  BP: --  BP(mean): --  RR: 16 (01-23-23 @ 07:56) (16 - 16)  SpO2: 98% (01-23-23 @ 07:56) (98% - 98%)    Orthostatic VS  01-23-23 @ 07:56  Lying BP: --/-- HR: --  Sitting BP: 101/67 HR: 86  Standing BP: 98/68 HR: 102  Site: upper right arm  Mode: electronic  Orthostatic VS  01-22-23 @ 08:09  Lying BP: --/-- HR: --  Sitting BP: 105/69 HR: 81  Standing BP: 103/61 HR: 93  Site: upper right arm  Mode: electronic  
Vital Signs Last 24 Hrs  T(C): 36.7 (01-20-23 @ 07:35), Max: 36.7 (01-20-23 @ 07:35)  T(F): 98 (01-20-23 @ 07:35), Max: 98 (01-20-23 @ 07:35)  HR: --  BP: --  BP(mean): --  RR: 16 (01-20-23 @ 07:35) (16 - 16)  SpO2: 100% (01-20-23 @ 07:35) (100% - 100%)    Orthostatic VS  01-20-23 @ 07:35  Lying BP: --/-- HR: --  Sitting BP: 142/74 HR: 85  Standing BP: 123/78 HR: 86  Site: upper right arm  Mode: electronic  Orthostatic VS  01-19-23 @ 07:07  Lying BP: --/-- HR: --  Sitting BP: 126/73 HR: 74  Standing BP: 118/74 HR: 98  Site: upper right arm  Mode: electronic  
Vital Signs Last 24 Hrs  T(C): 36.5 (01-12-23 @ 07:41), Max: 36.5 (01-12-23 @ 07:41)  T(F): 97.7 (01-12-23 @ 07:41), Max: 97.7 (01-12-23 @ 07:41)  HR: --  BP: --  BP(mean): --  RR: 18 (01-12-23 @ 07:41) (18 - 18)  SpO2: 100% (01-12-23 @ 07:41) (100% - 100%)    Orthostatic VS  01-12-23 @ 07:41  Lying BP: --/-- HR: --  Sitting BP: 115/73 HR: 85  Standing BP: 110/59 HR: 102  Site: upper right arm  Mode: electronic  Orthostatic VS  01-11-23 @ 07:49  Lying BP: --/-- HR: --  Sitting BP: 121/73 HR: 80  Standing BP: 123/83 HR: 87  Site: upper right arm  Mode: electronic

## 2023-01-23 NOTE — BH INPATIENT PSYCHIATRY PROGRESS NOTE - NSTXSUICIDINTERMD_PSY_ALL_CORE
1. Increased Lexapro dose to 20 mg po q am to further alleviate severe anxiety and depression  2.Ongoing staff support and encouragement  3.Pt encouraged to attend therapy groups as tolerated  4.Hospital Security  alerted to not allow admission to hospital of the pt's father and pt's uncle due to a reported h/o stalking of the pt and his mother  5.Melatonin dose increased to 5 mg po qhs ( insomnia)  6.Disposition planning ongoing
1. Increase Lexapro dose to 20 mg po q am to further alleviate severe anxiety and depression  2.Ongoing staff support and encouragement  3.Pt encouraged to attend therapy groups as tolerated  4.Hospital Security  alerted to not allow admission to hospital of the pt's father and pt's uncle due to a reported h/o stalking of the pt and his mother  5.Melatonin dose increased to 5 mg po qhs ( insomnia)  6.Disposition planning ongoing
1. Increased Lexapro dose to 20 mg po q am to further alleviate severe anxiety and depression  2.Ongoing staff support and encouragement  3.Pt encouraged to attend therapy groups as tolerated  4.Hospital Security  alerted to not allow admission to hospital of the pt's father and pt's uncle due to a reported h/o stalking of the pt and his mother  5.Melatonin dose increased to 5 mg po qhs ( insomnia)  6.Disposition planning ongoing
1. Increase Lexapro dose to 20 mg po q am to further alleviate severe anxiety and depression  2.Ongoing staff support and encouragement  3.Pt encouraged to attend therapy groups as tolerated  4.Hospital Security  alerted to not allow admission to hospital of the pt's father and pt's uncle due to a reported h/o stalking of the pt and his mother  5.Melatonin dose increased to 5 mg po qhs ( insomnia)  6.Disposition planning ongoing
1. Increased Lexapro dose to 20 mg po q am to further alleviate severe anxiety and depression  2.Ongoing staff support and encouragement  3.Pt encouraged to attend therapy groups as tolerated  4.Hospital Security  alerted to not allow admission to hospital of the pt's father and pt's uncle due to a reported h/o stalking of the pt and his mother  5.Melatonin dose increased to 5 mg po qhs ( insomnia)  6.Disposition planning ongoing

## 2023-01-23 NOTE — BH INPATIENT PSYCHIATRY PROGRESS NOTE - CURRENT MEDICATION
MEDICATIONS  (STANDING):  escitalopram 20 milliGRAM(s) Oral daily  influenza   Vaccine 0.5 milliLiter(s) IntraMuscular once  melatonin 10 milliGRAM(s) Oral at bedtime  multivitamin 1 Tablet(s) Oral daily    MEDICATIONS  (PRN):  acetaminophen     Tablet .. 650 milliGRAM(s) Oral every 6 hours PRN Temp greater or equal to 38C (100.4F), Mild Pain (1 - 3), Moderate Pain (4 - 6)  aluminum hydroxide/magnesium hydroxide/simethicone Suspension 30 milliLiter(s) Oral every 6 hours PRN Dyspepsia  magnesium hydroxide Suspension 30 milliLiter(s) Oral daily PRN Constipation  
MEDICATIONS  (STANDING):  escitalopram 20 milliGRAM(s) Oral daily  influenza   Vaccine 0.5 milliLiter(s) IntraMuscular once  melatonin 5 milliGRAM(s) Oral at bedtime  multivitamin 1 Tablet(s) Oral daily    MEDICATIONS  (PRN):  acetaminophen     Tablet .. 650 milliGRAM(s) Oral every 6 hours PRN Temp greater or equal to 38C (100.4F), Mild Pain (1 - 3), Moderate Pain (4 - 6)  aluminum hydroxide/magnesium hydroxide/simethicone Suspension 30 milliLiter(s) Oral every 6 hours PRN Dyspepsia  magnesium hydroxide Suspension 30 milliLiter(s) Oral daily PRN Constipation  
MEDICATIONS  (STANDING):  escitalopram 20 milliGRAM(s) Oral daily  influenza   Vaccine 0.5 milliLiter(s) IntraMuscular once  melatonin 5 milliGRAM(s) Oral at bedtime  multivitamin 1 Tablet(s) Oral daily    MEDICATIONS  (PRN):  acetaminophen     Tablet .. 650 milliGRAM(s) Oral every 6 hours PRN Temp greater or equal to 38C (100.4F), Mild Pain (1 - 3), Moderate Pain (4 - 6)  aluminum hydroxide/magnesium hydroxide/simethicone Suspension 30 milliLiter(s) Oral every 6 hours PRN Dyspepsia  magnesium hydroxide Suspension 30 milliLiter(s) Oral daily PRN Constipation  
MEDICATIONS  (STANDING):  escitalopram 20 milliGRAM(s) Oral daily  influenza   Vaccine 0.5 milliLiter(s) IntraMuscular once  melatonin 10 milliGRAM(s) Oral at bedtime  multivitamin 1 Tablet(s) Oral daily    MEDICATIONS  (PRN):  acetaminophen     Tablet .. 650 milliGRAM(s) Oral every 6 hours PRN Temp greater or equal to 38C (100.4F), Mild Pain (1 - 3), Moderate Pain (4 - 6)  aluminum hydroxide/magnesium hydroxide/simethicone Suspension 30 milliLiter(s) Oral every 6 hours PRN Dyspepsia  magnesium hydroxide Suspension 30 milliLiter(s) Oral daily PRN Constipation  
MEDICATIONS  (STANDING):  escitalopram 20 milliGRAM(s) Oral daily  influenza   Vaccine 0.5 milliLiter(s) IntraMuscular once  melatonin 5 milliGRAM(s) Oral at bedtime  multivitamin 1 Tablet(s) Oral daily    MEDICATIONS  (PRN):  acetaminophen     Tablet .. 650 milliGRAM(s) Oral every 6 hours PRN Temp greater or equal to 38C (100.4F), Mild Pain (1 - 3), Moderate Pain (4 - 6)  aluminum hydroxide/magnesium hydroxide/simethicone Suspension 30 milliLiter(s) Oral every 6 hours PRN Dyspepsia  magnesium hydroxide Suspension 30 milliLiter(s) Oral daily PRN Constipation  
MEDICATIONS  (STANDING):  escitalopram 20 milliGRAM(s) Oral daily  influenza   Vaccine 0.5 milliLiter(s) IntraMuscular once  melatonin 5 milliGRAM(s) Oral at bedtime  multivitamin 1 Tablet(s) Oral daily    MEDICATIONS  (PRN):  acetaminophen     Tablet .. 650 milliGRAM(s) Oral every 6 hours PRN Temp greater or equal to 38C (100.4F), Mild Pain (1 - 3), Moderate Pain (4 - 6)  aluminum hydroxide/magnesium hydroxide/simethicone Suspension 30 milliLiter(s) Oral every 6 hours PRN Dyspepsia  magnesium hydroxide Suspension 30 milliLiter(s) Oral daily PRN Constipation  
MEDICATIONS  (STANDING):  escitalopram 20 milliGRAM(s) Oral daily  influenza   Vaccine 0.5 milliLiter(s) IntraMuscular once  melatonin 10 milliGRAM(s) Oral at bedtime  multivitamin 1 Tablet(s) Oral daily    MEDICATIONS  (PRN):  acetaminophen     Tablet .. 650 milliGRAM(s) Oral every 6 hours PRN Temp greater or equal to 38C (100.4F), Mild Pain (1 - 3), Moderate Pain (4 - 6)  aluminum hydroxide/magnesium hydroxide/simethicone Suspension 30 milliLiter(s) Oral every 6 hours PRN Dyspepsia  magnesium hydroxide Suspension 30 milliLiter(s) Oral daily PRN Constipation  
MEDICATIONS  (STANDING):  escitalopram 20 milliGRAM(s) Oral daily  influenza   Vaccine 0.5 milliLiter(s) IntraMuscular once  melatonin 5 milliGRAM(s) Oral at bedtime  multivitamin 1 Tablet(s) Oral daily    MEDICATIONS  (PRN):  acetaminophen     Tablet .. 650 milliGRAM(s) Oral every 6 hours PRN Temp greater or equal to 38C (100.4F), Mild Pain (1 - 3), Moderate Pain (4 - 6)  aluminum hydroxide/magnesium hydroxide/simethicone Suspension 30 milliLiter(s) Oral every 6 hours PRN Dyspepsia  magnesium hydroxide Suspension 30 milliLiter(s) Oral daily PRN Constipation

## 2023-01-23 NOTE — BH INPATIENT PSYCHIATRY PROGRESS NOTE - NSBHMSEAFFQUAL_PSY_A_CORE
Depressed/Anxious
Depressed/Anxious/Other
Anxious/Other
Depressed/Anxious/Other
Depressed/Anxious/Other

## 2023-01-23 NOTE — BH INPATIENT PSYCHIATRY PROGRESS NOTE - NSTXDEPRESDATEEST_PSY_ALL_CORE
10-Aron-2023
10-Aron-2023
18-Jan-2023
10-Aron-2023
10-Aron-2023
18-Jan-2023
18-Jan-2023
10-Aron-2023

## 2023-01-23 NOTE — BH INPATIENT PSYCHIATRY PROGRESS NOTE - NSBHATTESTBILLING_PSY_A_CORE
86630-Idojbtlitt OBS or IP - high complexity OR 50-79 mins 34574-Eiekbsuxad OBS or IP - moderate complexity OR 35-49 mins

## 2023-01-23 NOTE — BH INPATIENT PSYCHIATRY PROGRESS NOTE - NSTXDCOTHRGOAL_PSY_ALL_CORE
Patient will be referred to the appropriate level of outpatient treatment and have appointments within 3-5 days of discharge.  Patient will be discharged to safe housing either back home or DSS emergency housing.  Benefits in place   will explore need for duel diagnosis tx with appointments if needed.

## 2023-01-23 NOTE — BH INPATIENT PSYCHIATRY PROGRESS NOTE - NSBHMSEKNOWHOW_PSY_ALL_CORE
Educational attainment

## 2023-01-23 NOTE — BH INPATIENT PSYCHIATRY PROGRESS NOTE - NSICDXBHSECONDARYDX_PSY_ALL_CORE
Intellectual disability   F79  Stress and adjustment reaction   F43.56  
Intellectual disability   F79  Stress and adjustment reaction   F43.11  
Intellectual disability   F79  Stress and adjustment reaction   F43.47  
Intellectual disability   F79  Stress and adjustment reaction   F43.45  
Intellectual disability   F79  Stress and adjustment reaction   F43.58  
Intellectual disability   F79  Stress and adjustment reaction   F43.35  
Intellectual disability   F79  Stress and adjustment reaction   F43.99  
Intellectual disability   F79  Stress and adjustment reaction   F43.94

## 2023-01-23 NOTE — BH INPATIENT PSYCHIATRY PROGRESS NOTE - NSTXDEPRESINTERMD_PSY_ALL_CORE
1. Increase Lexapro dose to 20 mg po q am to further alleviate severe anxiety and depression  2.Ongoing staff support and encouragement  3.Pt encouraged to attend therapy groups as tolerated  4.Hospital Security  alerted to not allow admission to hospital of the pt's father and pt's uncle due to a reported h/o stalking of the pt and his mother  5.Melatonin dose increased to 5 mg po qhs ( insomnia)  6.Disposition planning ongoing
1. Increased Lexapro dose to 20 mg po q am to further alleviate severe anxiety and depression  2.Ongoing staff support and encouragement  3.Pt encouraged to attend therapy groups as tolerated  4.Hospital Security  alerted to not allow admission to hospital of the pt's father and pt's uncle due to a reported h/o stalking of the pt and his mother  5.Melatonin dose increased to 5 mg po qhs ( insomnia)  6.Disposition planning ongoing
1. Increased Lexapro dose to 20 mg po q am to further alleviate severe anxiety and depression  2.Ongoing staff support and encouragement  3.Pt encouraged to attend therapy groups as tolerated  4.Hospital Security  alerted to not allow admission to hospital of the pt's father and pt's uncle due to a reported h/o stalking of the pt and his mother  5.Melatonin dose increased to 5 mg po qhs ( insomnia)  6.Disposition planning ongoing
1. Increase Lexapro dose to 20 mg po q am to further alleviate severe anxiety and depression  2.Ongoing staff support and encouragement  3.Pt encouraged to attend therapy groups as tolerated  4.Hospital Security  alerted to not allow admission to hospital of the pt's father and pt's uncle due to a reported h/o stalking of the pt and his mother  5.Melatonin dose increased to 5 mg po qhs ( insomnia)  6.Disposition planning ongoing
1. Increase Lexapro dose to 20 mg po q am to further alleviate severe anxiety and depression  2.Ongoing staff support and encouragement  3.Pt encouraged to attend therapy groups as tolerated  4.Hospital Security  alerted to not allow admission to hospital of the pt's father and pt's uncle due to a reported h/o stalking of the pt and his mother  5.Melatonin dose increased to 5 mg po qhs ( insomnia)  6.Disposition planning ongoing
1. Increased Lexapro dose to 20 mg po q am to further alleviate severe anxiety and depression  2.Ongoing staff support and encouragement  3.Pt encouraged to attend therapy groups as tolerated  4.Hospital Security  alerted to not allow admission to hospital of the pt's father and pt's uncle due to a reported h/o stalking of the pt and his mother  5.Melatonin dose increased to 5 mg po qhs ( insomnia)  6.Disposition planning ongoing
1. Increase Lexapro dose to 20 mg po q am to further alleviate severe anxiety and depression  2.Ongoing staff support and encouragement  3.Pt encouraged to attend therapy groups as tolerated  4.Hospital Security  alerted to not allow admission to hospital of the pt's father and pt's uncle due to a reported h/o stalking of the pt and his mother  5.Melatonin dose increased to 5 mg po qhs ( insomnia)  6.Disposition planning ongoing

## 2023-01-23 NOTE — BH INPATIENT PSYCHIATRY PROGRESS NOTE - NSTXPROBDEPRES_PSY_ALL_CORE
DEPRESSIVE SYMPTOMS
DEPRESSIVE SYMPTOMS
negative...
DEPRESSIVE SYMPTOMS

## 2023-01-23 NOTE — BH INPATIENT PSYCHIATRY PROGRESS NOTE - NSBHMSETHTPROC_PSY_A_CORE
Linear/Normal reasoning

## 2023-01-23 NOTE — BH INPATIENT PSYCHIATRY PROGRESS NOTE - NSBHMETABOLIC_PSY_ALL_CORE_FT
BMI: BMI (kg/m2): 27.1 (01-10-23 @ 13:24)  HbA1c: A1C with Estimated Average Glucose Result: 5.2 % (01-11-23 @ 07:40)    Glucose:   BP: --  Lipid Panel: Date/Time: 01-11-23 @ 07:40  Cholesterol, Serum: 174  Direct LDL: --  HDL Cholesterol, Serum: 47  Total Cholesterol/HDL Ration Measurement: --  Triglycerides, Serum: 87  
BMI: BMI (kg/m2): 27.1 (01-10-23 @ 13:24)  HbA1c: A1C with Estimated Average Glucose Result: 5.2 % (01-11-23 @ 07:40)    Glucose:   BP: --  Lipid Panel: Date/Time: 01-11-23 @ 07:40  Cholesterol, Serum: 174  Direct LDL: --  HDL Cholesterol, Serum: 47  Total Cholesterol/HDL Ration Measurement: --  Triglycerides, Serum: 87  
BMI: BMI (kg/m2): 27.1 (01-10-23 @ 13:24)  HbA1c: A1C with Estimated Average Glucose Result: 5.2 % (01-11-23 @ 07:40)    Lipid Panel: Date/Time: 01-11-23 @ 07:40  Cholesterol, Serum: 174  Direct LDL: --  HDL Cholesterol, Serum: 47  Total Cholesterol/HDL Ration Measurement: --  Triglycerides, Serum: 87  
BMI: BMI (kg/m2): 27.1 (01-10-23 @ 13:24)  HbA1c: A1C with Estimated Average Glucose Result: 5.2 % (01-11-23 @ 07:40)    Glucose:   BP: --  Lipid Panel: Date/Time: 01-11-23 @ 07:40  Cholesterol, Serum: 174  Direct LDL: --  HDL Cholesterol, Serum: 47  Total Cholesterol/HDL Ration Measurement: --  Triglycerides, Serum: 87  

## 2023-01-24 VITALS — OXYGEN SATURATION: 100 % | TEMPERATURE: 98 F | RESPIRATION RATE: 17 BRPM

## 2023-01-24 PROBLEM — Z78.9 OTHER SPECIFIED HEALTH STATUS: Chronic | Status: ACTIVE | Noted: 2023-01-10

## 2023-01-24 PROCEDURE — 99239 HOSP IP/OBS DSCHRG MGMT >30: CPT

## 2023-01-24 RX ORDER — LANOLIN ALCOHOL/MO/W.PET/CERES
1 CREAM (GRAM) TOPICAL
Qty: 14 | Refills: 1
Start: 2023-01-24 | End: 2023-02-20

## 2023-01-24 RX ORDER — ESCITALOPRAM OXALATE 10 MG/1
1 TABLET, FILM COATED ORAL
Qty: 14 | Refills: 1
Start: 2023-01-24 | End: 2023-02-20

## 2023-01-24 RX ADMIN — ESCITALOPRAM OXALATE 20 MILLIGRAM(S): 10 TABLET, FILM COATED ORAL at 08:57

## 2023-01-24 RX ADMIN — Medication 1 TABLET(S): at 08:57

## 2023-01-24 NOTE — BH INPATIENT PSYCHIATRY DISCHARGE NOTE - NSBHSUICIDESTATUS_PSY_ALL_CORE
The pt continues to deny any current active SI or HI.  The pt continues to deny any current active intent or plan to harm either himself or anyone else.

## 2023-01-24 NOTE — BH DISCHARGE NOTE NURSING/SOCIAL WORK/PSYCH REHAB - NSDCOTHERTYPOFSERV_GEN_ALL_CORE
Currently telehealth psychiatrist and therapist services with potential future in-person appointments. Accepts Medicare. Must register online to schedule an appointment: [email or web address]

## 2023-01-24 NOTE — BH INPATIENT PSYCHIATRY DISCHARGE NOTE - HOSPITAL COURSE
Interval History  Pt seen in the day room prior to his discharge from hospital on 1/24/2023.  The pt described feeling more comfortable around others and spoke of gaining more self confidence regarding his place her and in the world at large.  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  The pt continues to bravely face  his current and ongoing fears and anticipatory anxiety in the context of his and his family's future plans as they try to remain safely away from the pt's father who has reportedly been stalking the pt and his family for some time.  This writer and other hospital staff have continued to review pt safety planning via the pt's treatment plan  as it related to the pt's and his mother's reported safety concerns related to stalking by those two persons.  This writer continued to discuss and to update the pt as to his treatment plan with combination of antidepressant medication and individual and group therapies including DBT /CBT and mindfulness techniques in a concerted effort to alleviate the pt's PTSD related autonomic instability and hyperalert scanning and watchfulness of his surroundings.  Safety planning discussed with the pt by this writer related to hospital security and their having been notified of the pt's father and pt's uncle and of their NOT being allowed to visit the pt at any time during his stay.  The pt alluded obliquely to his ongoing family stressors and the names of the pt's father and his uncle are both listed in the  of 5 N as 2 persons not to be admitted to the hospital due to reported stalking of the pt and his mother.   The pt reported improved sleep up to 7 hours with continued now increased Melatonin dose of 10 mg po qhs to further alleviate the pt's insomnia. The pt also reported improving  appetite   and increased  PO intake in the context of his chronic PTSD related clinical picture.    The pt attended therapy groups and  spoke with therapist 1 on 1 as well to review and to modify the pt's safety plan.  The pt was also  given a notebook in which to journal. The pt is tolerating his now increased dose of SSRI Lexapro to 20 mg po q am in an effort to further alleviate the pt's current , severe and functionally impairing anxious depression and chronic PTSD . This writer reviewed with the pt the pt's treatment plan as it relates to ongoing pt safety planning and use of coping skills to be reinforced while the pt is in hospital. The pt remains anxious and depressed but he currently denies any acute active SI or HI.  Judgment and insight continue to slowly improve.

## 2023-01-24 NOTE — BH DISCHARGE NOTE NURSING/SOCIAL WORK/PSYCH REHAB - NSCDUDCCRISIS_PSY_A_CORE
.National Suicide Prevention Lifeline 7 [phone] 250-0454/.  [employer]  1 (291) LIFENET (151-8693)/.  [place] [facility] [place]  [phone]/.  [facility]  [address] [place] [address]  [phone]   Hours:  Monday through Friday from 9 AM to 3 PM/Other.../[facility] Suicide and Crisis Lifeline

## 2023-01-24 NOTE — BH INPATIENT PSYCHIATRY DISCHARGE NOTE - NSDCPROCEDURESFT_PSY_ALL_CORE
TSH=1.11  Fasting lipids  Cholesterol= 174  TG=87  YwW5Z=4.2  Urine tox screen negative for substances of potential abuse/misuse  BAL<10  SARS CO V 2 NEGATIVE 1/9/2023  COVID-19 AB + > 250   No studies are pending

## 2023-01-24 NOTE — BH INPATIENT PSYCHIATRY DISCHARGE NOTE - NSDCCPCAREPLAN_GEN_ALL_CORE_FT
PRINCIPAL DISCHARGE DIAGNOSIS  Diagnosis: Major depression, recurrent  Assessment and Plan of Treatment:       SECONDARY DISCHARGE DIAGNOSES  Diagnosis: Chronic post-traumatic stress disorder (PTSD)  Assessment and Plan of Treatment:

## 2023-01-24 NOTE — BH INPATIENT PSYCHIATRY DISCHARGE NOTE - NSBHDCRISKMITIGATEFT_PSY_ALL_CORE
The pt is amenable to ongoing individual therapy and med management in his community after pt DC home from hospital

## 2023-01-24 NOTE — BH DISCHARGE NOTE NURSING/SOCIAL WORK/PSYCH REHAB - NSDCADDINFO2FT_PSY_ALL_CORE
You have an IN-PERSON appointment at the [facility] scheduled for Thurs. 1/26 at 11am. This is a BRIDGE appointment until you can start with your regular appointments at the clinic.     ***PLEASE CALL IF YOU NEED TO CANCEL THESE APPOINTMENTS BECAUSE YOU FOUND A DIFFERENT PROVIDER.

## 2023-01-24 NOTE — BH DISCHARGE NOTE NURSING/SOCIAL WORK/PSYCH REHAB - NSDCADDINFO1FT_PSY_ALL_CORE
You have a [facility] telehealth intake scheduled for MONDAY 3/6/2023 at 1:30 PM with Dr. Quijano. An email will be sent to your mother's email with the instructions for accessing this appointment. *You should ask for therapist services at this appointment and can be placed on the waitlist. The clinic currently offers only telehealth services.     *Until you can start with your regular appointments at this outpatient clinic, you will need to be seen at the [facility] crisis center. Please see next for your appointment scheduled at the crisis center.

## 2023-01-24 NOTE — BH INPATIENT PSYCHIATRY DISCHARGE NOTE - NSDCMRMEDTOKEN_GEN_ALL_CORE_FT
escitalopram 20 mg oral tablet: 1 tab(s) orally once a day  melatonin 10 mg oral tablet: 1 tab(s) orally once a day (at bedtime)  Multiple Vitamins oral tablet: 1 tab(s) orally once a day

## 2023-01-24 NOTE — BH INPATIENT PSYCHIATRY DISCHARGE NOTE - NSBHMETABOLIC_PSY_ALL_CORE_FT
BMI: BMI (kg/m2): 27.1 (01-10-23 @ 13:24)  HbA1c: A1C with Estimated Average Glucose Result: 5.2 % (01-11-23 @ 07:40)    Lipid Panel: Date/Time: 01-11-23 @ 07:40  Cholesterol, Serum: 174  Direct LDL: --  HDL Cholesterol, Serum: 47  Total Cholesterol/HDL Ration Measurement: --  Triglycerides, Serum: 87
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
## 2023-01-24 NOTE — BH DISCHARGE NOTE NURSING/SOCIAL WORK/PSYCH REHAB - PATIENT PORTAL LINK FT
You can access the FollowMyHealth Patient Portal offered by [facility] by registering at the following website: [email or web address]. By joining [facility]’s FollowMyHealth portal, you will also be able to view your health information using other applications (apps) compatible with our system.

## 2023-01-24 NOTE — BH INPATIENT PSYCHIATRY DISCHARGE NOTE - NSDCRECOMMENDLABFT_PSY_ALL_CORE
CBC with diff, CMP q yearly  with current med regimen and per recommended q yearly routine physical exam  Monitoring of vital signs as per  physical exam

## 2023-01-25 RX ORDER — INFLUENZA VIRUS VACCINE 15; 15; 15; 15 UG/.5ML; UG/.5ML; UG/.5ML; UG/.5ML
0.5 SUSPENSION INTRAMUSCULAR ONCE
Refills: 0 | Status: ACTIVE | OUTPATIENT
Start: 2023-01-25 | End: 2023-12-24

## 2023-01-26 ENCOUNTER — OUTPATIENT (OUTPATIENT)
Dept: OUTPATIENT SERVICES | Facility: HOSPITAL | Age: 28
LOS: 1 days | Discharge: HOME | End: 2023-01-26

## 2023-01-26 NOTE — CHART NOTE - NSCHARTNOTEFT_GEN_A_CORE
Arrived home and has medication.  Denies suicidal thoughts.
Case discussed w/ tx. plan and pt. scheduled for d/c on 1/24. SW met w/ pt. and with pt. consent spoke pt.'s mother on phone and during visiting hrs on 1/23 and 1/24 for in exploration of pt. aftercare. SW with multiple calls to insurance to explore barriers to finding providers with pt.'s insurance. SOILA verified with pt.'s mother pt. w/ active Medicare and that [facility] insurance is to be secondary despite originally finding it listed as primary and a [place] policy. SW explored multiple referrals in [facility] where pt. and pt.'s mother temporarily residing. With pt.'s consent, SW made referral to [facility] outpt. clinic as unable to find in-person provider at this time. Pt. originally provided aftercare telehealth intake on 3/6 and SW scheduled bridge appt. at [facility] on 1/26. SW relayed that pt. would need to advocate for pt. to have therapist services too and will be added to wailist for individual and receive group until individual available as per Lisa of [facility] intake. Pt. and pt.'s mother agreed to accept [facility] appt. for discharge while exploring alternatives as needed. SW suggested [facility] that accepts both pt. insurance but was informed that pt. must complete online registration with personal email to sign up. SW discussed with pt. and pt.'s mother and pt. requesting his mother to help him sign up for such services as alternative. SW emailed pt.'s mother link to [facility] and encouraged to reach out for guidance as needed. On 1/24, SW met w/ pt. and spoke to pt.'s mother extensively to review d/c plan and provide psychoeduction. Pt. reporting eagerness for d/c and denying SI/HI and describing benefits of inpt. stay for also realizing he has support. Pt. was educated on the importance of taking medications as prescribed and to not stop or miss any doses unless directed by prescribing provider. Pt. was counseled on the importance of attending outpatient appointments for ongoing development of coping skills in particular. Pt. was made aware of crisis resources to use after hours as needed including returning to the hospital and [facility] crisis center. Pt. mother confirmed she will transport pt. back to address in [place]. On 1/25, SOILA received call from Lisa of [facility] intake advising of cancellation and offering pt. with intake on 1/26 at 9am via [facility] for [facility] clinic. SW called pt.'s mother and spoke to her and pt. to discuss. Both in agreement to take appt. and SW advised that bridge appt. and other intake will be cancelled. Pt.'s mother noted that she also had trouble signing up for [facility] services online and both are satisfied with pt. pursuing [facility] for services instead. SOILA notified Lisa of pt. accepting appt. No other d/c needs identified. [facility] informed that pt. d/c summary in [place] for continuation of care and SW advised that should reach out for fax as needed.

## 2023-01-27 DIAGNOSIS — F43.20 ADJUSTMENT DISORDER, UNSPECIFIED: ICD-10-CM

## 2023-01-27 DIAGNOSIS — F70 MILD INTELLECTUAL DISABILITIES: ICD-10-CM

## 2023-01-27 DIAGNOSIS — F33.9 MAJOR DEPRESSIVE DISORDER, RECURRENT, UNSPECIFIED: ICD-10-CM

## 2023-01-27 DIAGNOSIS — Z28.21 IMMUNIZATION NOT CARRIED OUT BECAUSE OF PATIENT REFUSAL: ICD-10-CM

## 2023-01-27 DIAGNOSIS — F39 UNSPECIFIED MOOD [AFFECTIVE] DISORDER: ICD-10-CM

## 2023-01-27 DIAGNOSIS — F43.12 POST-TRAUMATIC STRESS DISORDER, CHRONIC: ICD-10-CM

## 2023-01-27 DIAGNOSIS — Z20.822 CONTACT WITH AND (SUSPECTED) EXPOSURE TO COVID-19: ICD-10-CM

## 2023-01-27 DIAGNOSIS — Z79.899 OTHER LONG TERM (CURRENT) DRUG THERAPY: ICD-10-CM

## 2023-01-27 DIAGNOSIS — Z56.0 UNEMPLOYMENT, UNSPECIFIED: ICD-10-CM

## 2023-01-27 DIAGNOSIS — Z91.51 PERSONAL HISTORY OF SUICIDAL BEHAVIOR: ICD-10-CM

## 2023-01-27 DIAGNOSIS — R45.851 SUICIDAL IDEATIONS: ICD-10-CM

## 2023-01-27 DIAGNOSIS — R73.9 HYPERGLYCEMIA, UNSPECIFIED: ICD-10-CM

## 2023-01-27 DIAGNOSIS — F43.29 ADJUSTMENT DISORDER WITH OTHER SYMPTOMS: ICD-10-CM

## 2023-01-27 DIAGNOSIS — F43.10 POST-TRAUMATIC STRESS DISORDER, UNSPECIFIED: ICD-10-CM

## 2023-01-27 SDOH — ECONOMIC STABILITY - INCOME SECURITY: UNEMPLOYMENT, UNSPECIFIED: Z56.0

## 2025-01-26 NOTE — ED ADULT NURSE NOTE - PRO INTERPRETER NEED 2
DVT PPx: Lovenox   Diet: Regular   PT/OT: PT consulted   Code Status: Full Code   Dispo: Pending
English